# Patient Record
Sex: FEMALE
[De-identification: names, ages, dates, MRNs, and addresses within clinical notes are randomized per-mention and may not be internally consistent; named-entity substitution may affect disease eponyms.]

---

## 2019-10-28 PROBLEM — Z00.00 ENCOUNTER FOR PREVENTIVE HEALTH EXAMINATION: Status: ACTIVE | Noted: 2019-10-28

## 2019-11-18 ENCOUNTER — APPOINTMENT (OUTPATIENT)
Dept: OTOLARYNGOLOGY | Facility: CLINIC | Age: 49
End: 2019-11-18
Payer: COMMERCIAL

## 2019-11-18 VITALS
DIASTOLIC BLOOD PRESSURE: 93 MMHG | SYSTOLIC BLOOD PRESSURE: 152 MMHG | BODY MASS INDEX: 30.61 KG/M2 | WEIGHT: 195 LBS | OXYGEN SATURATION: 96 % | RESPIRATION RATE: 15 BRPM | HEIGHT: 67 IN | HEART RATE: 74 BPM | TEMPERATURE: 98.4 F

## 2019-11-18 DIAGNOSIS — Z78.9 OTHER SPECIFIED HEALTH STATUS: ICD-10-CM

## 2019-11-18 DIAGNOSIS — H61.21 IMPACTED CERUMEN, RIGHT EAR: ICD-10-CM

## 2019-11-18 DIAGNOSIS — E04.2 NONTOXIC MULTINODULAR GOITER: ICD-10-CM

## 2019-11-18 DIAGNOSIS — Z87.891 PERSONAL HISTORY OF NICOTINE DEPENDENCE: ICD-10-CM

## 2019-11-18 DIAGNOSIS — D44.0 NEOPLASM OF UNCERTAIN BEHAVIOR OF THYROID GLAND: ICD-10-CM

## 2019-11-18 DIAGNOSIS — Z82.49 FAMILY HISTORY OF ISCHEMIC HEART DISEASE AND OTHER DISEASES OF THE CIRCULATORY SYSTEM: ICD-10-CM

## 2019-11-18 DIAGNOSIS — Z80.9 FAMILY HISTORY OF MALIGNANT NEOPLASM, UNSPECIFIED: ICD-10-CM

## 2019-11-18 DIAGNOSIS — J04.0 ACUTE LARYNGITIS: ICD-10-CM

## 2019-11-18 DIAGNOSIS — R13.10 DYSPHAGIA, UNSPECIFIED: ICD-10-CM

## 2019-11-18 DIAGNOSIS — K21.9 ACUTE LARYNGITIS: ICD-10-CM

## 2019-11-18 PROCEDURE — G0268 REMOVAL OF IMPACTED WAX MD: CPT

## 2019-11-18 PROCEDURE — 31575 DIAGNOSTIC LARYNGOSCOPY: CPT

## 2019-11-18 PROCEDURE — 99204 OFFICE O/P NEW MOD 45 MIN: CPT | Mod: 25

## 2019-11-18 NOTE — CONSULT LETTER
[Dear  ___] : Dear  [unfilled], [Consult Letter:] : I had the pleasure of evaluating your patient, [unfilled]. [Please see my note below.] : Please see my note below. [Consult Closing:] : Thank you very much for allowing me to participate in the care of this patient.  If you have any questions, please do not hesitate to contact me. [Sincerely,] : Sincerely, [FreeTextEntry3] : \par Lucho Fox M.D., FACS, [facility]\par Director [facility]\par [facility]\par Certified in Thyroid/Parathyroid/Neck Ultrasound, [facility]/ [facility]\par \par , Department of Otolaryngology\par [facility] [employer] [facility]\par

## 2019-11-18 NOTE — REASON FOR VISIT
[FreeTextEntry2] : a NTMNG with a benign FNAB in the past. [FreeTextEntry1] : Gynecologist is Brenda Hoskins MD

## 2019-11-18 NOTE — PROCEDURE
[Unable to Cooperate with Mirror] : patient unable to cooperate with mirror [Image(s) Captured] : image(s) captured and filed [Gag Reflex] : gag reflex preventing mirror examination [Topical Lidocaine] : topical lidocaine [Serial Number: ___] : Serial Number: [unfilled] [Oxymetazoline HCl] : oxymetazoline HCl [Flexible Endoscope] : examined with the flexible endoscope [Cerumen Impaction] : Cerumen Impaction [] : Removal of Cerumen [Same] : same as the Pre Op Dx. [de-identified] : The nasal septum is minimally deviated to the right.  There are no masses or polyps and the nasal mucosa and secretions are normal. The choanae and posterior nasopharynx are normal without masses or drainage. The Eustachian tube orifices appear patent. The pharynx, including the posterior and lateral pharyngeal walls, the vallecula and base of tongue are normal without ulcerations, lesions or masses. The hypopharynx including the pyriform sinuses open well without pooling of secretions, mucosal lesions or masses. The supraglottic larynx including the epiglottis, petiole, arytenoids, glossoepiglottic, aryepiglottic and pharyngoepiglottic folds are normal without mucosal lesions, ulcerations or masses. The glottis reveals normal false vocal folds. The true vocal folds are glistening white, tense and of equal length, without paralysis, having symmetric mobility on adduction and abduction. There are no mucosal lesions, nodules, cysts, erythroplasia or leukoplakia. The posterior cricoid area has healthy pink mucosa in the interarytenoid area and esophageal inlet. There is mild thickening/edema of the interarytenoid mucosa suggestive of posterior laryngitis from laryngopharyngeal acid reflux disease. The trachea is clear without narrowing in the immediate subglottic region, without deviation or lesions. \par  [de-identified] : Odynophagia and thyroid nodules [FreeTextEntry1] : Hearing loss AD [FreeTextEntry6] : copious cerumen impacted AD.  TM normal after removal with alligator forceps after irritations.

## 2019-11-18 NOTE — HISTORY OF PRESENT ILLNESS
[de-identified] : Mikki is a generally healthy 49-year-old female with a NTMNG who works in sales in the fashion industry. .  She  had an FNAB from a dominant nodule in 2010.  She has not had a f/u ultrasound since 2011. On that study which was compared to a study obtained in October of 2010,  the thyroid gland was not significantly enlarged. There were numerous subcentimeter right lobe nodules including a right inferior anterior lateral hypoechoic nodule measuring up to 6 mm and stable, the right mid superior hypoechoic nodule measuring up to 7 mm and stable. A right inferior posterior complex nodule measured 7 mm in greatest dimension and was new compared to the findings in 2010. A right superior complex cyst measuring up to 5 mm and stable. A left mid complex cyst measured 7 mm. An adjacent complex cyst measured 4 mm and smaller than on prior exam. A left inferior heterogeneous hypoechoic nodule measured up to 8 mm and decreased in size. There were no microcalcifications or enlarged regional lymph nodes mentioned on that report. She is euthyroid and her weight is relatively stable with gain of a few pounds.  Mikki denies recent shortness of breath, voice changes, dysphagia, anterior neck pain, neck pressure or mass. There is no family history of thyroid cancer but her mother also had a NTMNG.   She denies any known radiation exposures in her youth.  She was essentially lost to followup for her thyroid disease but developed a sore throat (right sided, 4/10) with odynophagia approximately 3 weeks ago and was treated in an outpatient facility with antibiotics with some improvement. She still feels a discomfort in her throat when swallowing.  This made her concerned about her thyroid gland and wanted an examination. She also has mild hearing loss AD to finger rub.

## 2022-04-11 PROBLEM — J04.0 REFLUX LARYNGITIS: Status: ACTIVE | Noted: 2019-11-18
